# Patient Record
Sex: MALE | Race: WHITE
[De-identification: names, ages, dates, MRNs, and addresses within clinical notes are randomized per-mention and may not be internally consistent; named-entity substitution may affect disease eponyms.]

---

## 2021-12-13 ENCOUNTER — HOSPITAL ENCOUNTER (EMERGENCY)
Dept: HOSPITAL 56 - MW.ED | Age: 81
Discharge: HOME | End: 2021-12-13
Payer: MEDICARE

## 2021-12-13 DIAGNOSIS — Z87.891: ICD-10-CM

## 2021-12-13 DIAGNOSIS — Z79.899: ICD-10-CM

## 2021-12-13 DIAGNOSIS — Z79.82: ICD-10-CM

## 2021-12-13 DIAGNOSIS — E03.9: ICD-10-CM

## 2021-12-13 DIAGNOSIS — S49.92XA: Primary | ICD-10-CM
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
## 2021-12-13 NOTE — EDM.PDOC
ED HPI GENERAL MEDICAL PROBLEM





- General


Chief Complaint: Upper Extremity Injury/Pain


Stated Complaint: LT SHOULDER


Time Seen by Provider: 12/13/21 11:15


Source of Information: Reports: Patient





- History of Present Illness


INITIAL COMMENTS - FREE TEXT/NARRATIVE: 





Patient presents with left shoulder pain.  The patient slipped and grabbed his 

truck and pulled his arm and felt something in his left shoulder.  No head or 

neck injury.  Moderate symptoms worse with movement.  No numbness or weakness


  ** L shoulder


Pain Score (Numeric/FACES): 10





- Related Data


                                    Allergies











Allergy/AdvReac Type Severity Reaction Status Date / Time


 


No Known Allergies Allergy   Verified 12/13/21 11:14











Home Meds: 


                                    Home Meds





Aspirin 81 mg PO DAILY 12/13/21 [History]


Cholecalciferol (Vitamin D3) [Vitamin D3] 5,000 unit PO DAILY 12/13/21 [History]


Levothyroxine Sodium [Synthroid] 75 mcg PO DAILY 12/13/21 [History]











Past Medical History


HEENT History: Reports: None


Cardiovascular History: Reports: None


Respiratory History: Reports: None


Gastrointestinal History: Reports: None


Genitourinary History: Reports: None


Musculoskeletal History: Reports: None


Neurological History: Reports: None


Psychiatric History: Reports: None


Endocrine/Metabolic History: Reports: Hypothyroidism


Hematologic History: Reports: None


Immunologic History: Reports: None


Oncologic (Cancer) History: Reports: None


Dermatologic History: Reports: None





- Infectious Disease History


Infectious Disease History: Reports: Chicken Pox, Measles, Mumps





- Past Surgical History


Head Surgeries/Procedures: Reports: None


Endocrine Surgical History: Reports: None


Musculoskeletal Surgical History: Reports: Knee Replacement, Other (See Below)


Other Musculoskeletal Surgeries/Procedures:: bilateral knee replacement





Social & Family History





- Family History


Family Medical History: No Pertinent Family History





- Tobacco Use


Tobacco Use Status *Q: Former Tobacco User


Used Tobacco, but Quit: Yes


Month/Year Tobacco Last Used: 1985





- Caffeine Use


Caffeine Use: Reports: Coffee





- Recreational Drug Use


Recreational Drug Use: No





Review of Systems





- Review of Systems


Review Of Systems: See Below


Constitutional: Reports: No Symptoms


Ears: Reports: Other (No head injury)


Musculoskeletal: Reports: Shoulder Pain


Skin: Reports: No Symptoms


Neurological: Denies: Numbness, Tingling, Weakness





ED EXAM, GENERAL





- Physical Exam


Exam: See Below


Free Text/Narrative:: 





CONSTITUTIONAL: well appearing in no acute distress


SKIN: dry, and intact without rash


HENT: Normocephalic, atraumatic,


NECK: normal range of motion


PULMONARY: normal chest rise and fall, no respiratory distress or stridor


NEUROLOGIC: normal speech, moves all extremities, grossly non-focal


MUSCULOSKELETAL: Mild tenderness to the left shoulder.  Patient has good full 

range of motion of the shoulder.  Strong radial pulse.  Cap refill less than 2 

seconds.  Sensorimotor function in place


PSYCHIATRIC: normal mood and affect





Course





- Vital Signs


Text/Narrative:: 





Differential diagnosis: Rotator cuff injury, fracture, dislocation, strain, 

other





Patient presents with shoulder injury.  X-ray negative.  Neurovascular intact.  

NSAIDs with PCP and Ortho follow-up discussed if symptoms persist


Last Recorded V/S: 





                                Last Vital Signs











Temp  36.1 C   12/13/21 11:16


 


Pulse  60   12/13/21 11:16


 


Resp  16   12/13/21 11:16


 


BP  161/84 H  12/13/21 11:16


 


Pulse Ox  94 L  12/13/21 11:16














Departure





- Departure


Time of Disposition: 13:52


Disposition: Home, Self-Care 01


Condition: Good


Clinical Impression: 


 Injury of left shoulder








- Discharge Information


Instructions:  Shoulder Sprain


Referrals: 


PCP,None [Primary Care Provider] - 


Additional Instructions: 


Take ibuprofen over-the-counter for pain as needed.  Follow-up with primary care

doctor or an orthopedic doctor if your pain persist for next step in treatment 

and management.  Return for any change or worsening condition or numbness or 

weakness.

















-----------------------------------

-------------------------------------------------------





The following information is given to patients seen in the emergency department 

who are being discharged to home. This information is to outline your options 

for follow-up care. We provide all patients seen in our emergency department 

with a follow-up referral.





The need for follow-up, as well as the timing and circumstances, are variable de

pending upon the specifics of your emergency department visit.





If you don't have a primary care physician on staff, we will provide you with a 

referral. We always advise you to contact your personal physician following an 

emergency department visit to inform them of the circumstance of the visit and 

for follow-up with them and/or the need for any referrals to a consulting 

specialist.





The emergency department will also refer you to a specialist when appropriate. 

This referral assures that you have the opportunity for follow-up care with a 

specialist. All of these measure are taken in an effort to provide you with 

optimal care, which includes your follow-up.











Primary care clinics in the area:





[facility]


[address] [place] [address]


Phone: [phone]


Fax: [phone]








[facility]


[address] [place] [address]


Phone: [phone]


Fax: [phone]





Under all circumstances we always encourage you to contact your private 

physician who remains a resource for coordinating your care. When calling for 

follow-up care, please make the office aware that this follow-up is from your 

recent emergency room visit. If for any reason you are refused follow-up, please

contact the [facility] Emergency Department

at [phone] and asked to speak to the emergency department charge nurse.








Sepsis Event Note (ED)





- Evaluation


Sepsis Screening Result: No Definite Risk





- Focused Exam


Vital Signs: 





                                   Vital Signs











  Temp Pulse Resp BP Pulse Ox


 


 12/13/21 11:16  36.1 C  60  16  161/84 H  94 L

## 2021-12-13 NOTE — CR
Indication:



Injured 4 days ago with pain



Technique:



Three images as AP, Y and axillary views



Comparison:



None



Findings:



No fracture, dislocation or destructive process. Mild degenerative changes. 

No soft tissue abnormality by plain film. No foreign body. Incidental 

degenerative changes of the spine.



Impression:



Degenerative changes. No visible acute fracture, dislocation or destructive 

process.



Dictated by Brian Jorge MD @ 12/13/2021 1:12:39 PM



(Electronically Signed)